# Patient Record
Sex: MALE | Race: BLACK OR AFRICAN AMERICAN | URBAN - METROPOLITAN AREA
[De-identification: names, ages, dates, MRNs, and addresses within clinical notes are randomized per-mention and may not be internally consistent; named-entity substitution may affect disease eponyms.]

---

## 2019-06-14 ENCOUNTER — OFFICE (OUTPATIENT)
Dept: URBAN - METROPOLITAN AREA CLINIC 78 | Facility: CLINIC | Age: 44
End: 2019-06-14

## 2019-06-14 VITALS
HEART RATE: 62 BPM | DIASTOLIC BLOOD PRESSURE: 94 MMHG | HEIGHT: 70 IN | SYSTOLIC BLOOD PRESSURE: 131 MMHG | TEMPERATURE: 97.5 F | WEIGHT: 190 LBS

## 2019-06-14 DIAGNOSIS — B19.10 UNSPECIFIED VIRAL HEPATITIS B WITHOUT HEPATIC COMA: ICD-10-CM

## 2019-06-14 PROCEDURE — 99244 OFF/OP CNSLTJ NEW/EST MOD 40: CPT

## 2019-06-14 NOTE — SERVICEHPINOTES
VITUS  AVOVABEY   is a   43   year old male who is being seen in consultation at the request of   VALERIE BAIG   for HBV diagnosed in 04/2019. He is here to establish care with our office as new patient. He suspects HBV contracted from childhood due to sister who also has it too: He was born in [place]. He states no h/o IVDU, blood transfusions, unprotected sex with high risk partners. Requesting labs from PCP (pending review will know what other labs may be needed). No RUQ u/s done so far. Prior colonoscopy in 2009 for "anal/rectal bleeding" that found evidence of hemorrhoids, otherwise "normal" per patent report. He states long h/o "heart palpations" that have been evaluated by PCP and cardiologist with negative cardiac work up per patient report. He states overall being healthy and HTN that is managed on lisinopril 5 mg qd. Denies chest pain, n/v, abdominal pain, melena, blood in stool, weight loss. No other complaints. BR

## 2019-12-04 ENCOUNTER — OFFICE (OUTPATIENT)
Dept: URBAN - METROPOLITAN AREA CLINIC 78 | Facility: CLINIC | Age: 44
End: 2019-12-04

## 2019-12-04 VITALS
HEIGHT: 70 IN | DIASTOLIC BLOOD PRESSURE: 75 MMHG | HEART RATE: 67 BPM | TEMPERATURE: 97.3 F | WEIGHT: 190 LBS | SYSTOLIC BLOOD PRESSURE: 123 MMHG

## 2019-12-04 DIAGNOSIS — B19.10 UNSPECIFIED VIRAL HEPATITIS B WITHOUT HEPATIC COMA: ICD-10-CM

## 2019-12-04 PROCEDURE — 99214 OFFICE O/P EST MOD 30 MIN: CPT

## 2019-12-04 NOTE — INTERFACERESULTNOTES
Low hepatitis B viral level but ALT liver enzyme is a little higher than optimal for men (like to see 35 or lower, yours if 46). Fibroscan shows some possible early fibrosis (scarring), so based on these factors as well as all the other things we discussed at the visit (age, male gender,  background, type of Hep B), I would proceed with the Viread (tenofovir disoproxil) 1 pill daily. Recheck CMP, HBV DNA, AFP in 3 months.

## 2019-12-04 NOTE — SERVICEHPINOTES
43 yo [place] male presents for f/u hepatitis B. He has eAg negative Hep B with recent normal liver enzymes and low viral level. He is treatment-naive, though Vemlidy had been prescribed and was denied Viread (generic) would be covered but patient wanted to hold off on treatment for now. He has an U/S scheduled for this month as he will be due for routine HCC screening. He has not yet had a Fibroscan. He feels well overall and has no GI concerns today.  Prior hx: He suspects HBV contracted from childhood his sister also has it.  He notes that he had a brother who passed away, possibly related to his liver but he is not sure. He states no h/o IVDU, blood transfusions, unprotected sex with high risk partners. 8/28/19 plt 168, AST/ALT 22/27, INR 1, HBV  IU/ml, HBeAg neg, AFP 2.3, Hep A ab tot pos/immune, Hep C ab negBR

## 2019-12-07 LAB
CBC (INCLUDES DIFF/PLT): ABSOLUTE BAND NEUTROPHILS: NORMAL CELLS/UL
CBC (INCLUDES DIFF/PLT): ABSOLUTE BASOPHILS: 21 CELLS/UL (ref 0–200)
CBC (INCLUDES DIFF/PLT): ABSOLUTE BLASTS: NORMAL CELLS/UL
CBC (INCLUDES DIFF/PLT): ABSOLUTE EOSINOPHILS: 39 CELLS/UL (ref 15–500)
CBC (INCLUDES DIFF/PLT): ABSOLUTE LYMPHOCYTES: 1842 CELLS/UL (ref 850–3900)
CBC (INCLUDES DIFF/PLT): ABSOLUTE METAMYELOCYTES: NORMAL CELLS/UL
CBC (INCLUDES DIFF/PLT): ABSOLUTE MONOCYTES: 402 CELLS/UL (ref 200–950)
CBC (INCLUDES DIFF/PLT): ABSOLUTE MYELOCYTES: NORMAL CELLS/UL
CBC (INCLUDES DIFF/PLT): ABSOLUTE NEUTROPHILS: 696 CELLS/UL — LOW (ref 1500–7800)
CBC (INCLUDES DIFF/PLT): ABSOLUTE NUCLEATED RBC: NORMAL CELLS/UL
CBC (INCLUDES DIFF/PLT): ABSOLUTE PROMYELOCYTES: NORMAL CELLS/UL
CBC (INCLUDES DIFF/PLT): BAND NEUTROPHILS: NORMAL %
CBC (INCLUDES DIFF/PLT): BASOPHILS: 0.7 %
CBC (INCLUDES DIFF/PLT): BLASTS: NORMAL %
CBC (INCLUDES DIFF/PLT): COMMENT(S): NORMAL
CBC (INCLUDES DIFF/PLT): EOSINOPHILS: 1.3 %
CBC (INCLUDES DIFF/PLT): HEMATOCRIT: 40.2 % (ref 38.5–50)
CBC (INCLUDES DIFF/PLT): HEMOGLOBIN: 14 G/DL (ref 13.2–17.1)
CBC (INCLUDES DIFF/PLT): LYMPHOCYTES: 61.4 %
CBC (INCLUDES DIFF/PLT): MCH: 26.6 PG — LOW (ref 27–33)
CBC (INCLUDES DIFF/PLT): MCHC: 34.8 G/DL (ref 32–36)
CBC (INCLUDES DIFF/PLT): MCV: 76.4 FL — LOW (ref 80–100)
CBC (INCLUDES DIFF/PLT): METAMYELOCYTES: NORMAL %
CBC (INCLUDES DIFF/PLT): MONOCYTES: 13.4 %
CBC (INCLUDES DIFF/PLT): MPV: 10.3 FL (ref 7.5–12.5)
CBC (INCLUDES DIFF/PLT): MYELOCYTES: NORMAL %
CBC (INCLUDES DIFF/PLT): NEUTROPHILS: 23.2 %
CBC (INCLUDES DIFF/PLT): NUCLEATED RBC: NORMAL /100 WBC
CBC (INCLUDES DIFF/PLT): PLATELET COUNT: 166 THOUSAND/UL (ref 140–400)
CBC (INCLUDES DIFF/PLT): PROMYELOCYTES: NORMAL %
CBC (INCLUDES DIFF/PLT): RDW: 15 % (ref 11–15)
CBC (INCLUDES DIFF/PLT): REACTIVE LYMPHOCYTES: NORMAL %
CBC (INCLUDES DIFF/PLT): RED BLOOD CELL COUNT: 5.26 MILLION/UL (ref 4.2–5.8)
CBC (INCLUDES DIFF/PLT): WHITE BLOOD CELL COUNT: 3 THOUSAND/UL — LOW (ref 3.8–10.8)
COMPREHENSIVE METABOLIC PANEL: ALBUMIN/GLOBULIN RATIO: 1.9 (CALC) (ref 1–2.5)
COMPREHENSIVE METABOLIC PANEL: ALBUMIN: 4.6 G/DL (ref 3.6–5.1)
COMPREHENSIVE METABOLIC PANEL: ALKALINE PHOSPHATASE: 52 U/L (ref 40–115)
COMPREHENSIVE METABOLIC PANEL: ALT: 46 U/L (ref 9–46)
COMPREHENSIVE METABOLIC PANEL: AST: 39 U/L (ref 10–40)
COMPREHENSIVE METABOLIC PANEL: BILIRUBIN, TOTAL: 0.8 MG/DL (ref 0.2–1.2)
COMPREHENSIVE METABOLIC PANEL: BUN/CREATININE RATIO: (no result) (CALC)
COMPREHENSIVE METABOLIC PANEL: CALCIUM: 9.6 MG/DL (ref 8.6–10.3)
COMPREHENSIVE METABOLIC PANEL: CARBON DIOXIDE: 31 MMOL/L (ref 20–32)
COMPREHENSIVE METABOLIC PANEL: CHLORIDE: 103 MMOL/L (ref 98–110)
COMPREHENSIVE METABOLIC PANEL: CREATININE: 0.88 MG/DL (ref 0.6–1.35)
COMPREHENSIVE METABOLIC PANEL: EGFR AFRICAN AMERICAN: 120 ML/MIN/1.73M2 (ref 60–?)
COMPREHENSIVE METABOLIC PANEL: EGFR NON-AFR. AMERICAN: 104 ML/MIN/1.73M2 (ref 60–?)
COMPREHENSIVE METABOLIC PANEL: GLOBULIN: 2.4 G/DL (CALC) (ref 1.9–3.7)
COMPREHENSIVE METABOLIC PANEL: GLUCOSE: 96 MG/DL (ref 65–99)
COMPREHENSIVE METABOLIC PANEL: POTASSIUM: 4.8 MMOL/L (ref 3.5–5.3)
COMPREHENSIVE METABOLIC PANEL: PROTEIN, TOTAL: 7 G/DL (ref 6.1–8.1)
COMPREHENSIVE METABOLIC PANEL: SODIUM: 140 MMOL/L (ref 135–146)
COMPREHENSIVE METABOLIC PANEL: UREA NITROGEN (BUN): 17 MG/DL (ref 7–25)
HEPATITIS B VIRUS DNA, QN, REAL TIME PCR: HEPATITIS B VIRUS DNA: 188 IU/ML — HIGH
HEPATITIS B VIRUS DNA, QN, REAL TIME PCR: HEPATITIS B VIRUS DNA: 2.27 LOG IU/ML — HIGH

## 2019-12-09 ENCOUNTER — OFFICE (OUTPATIENT)
Dept: URBAN - METROPOLITAN AREA CLINIC 101 | Facility: CLINIC | Age: 44
End: 2019-12-09

## 2019-12-09 DIAGNOSIS — B19.10 UNSPECIFIED VIRAL HEPATITIS B WITHOUT HEPATIC COMA: ICD-10-CM

## 2019-12-09 PROCEDURE — 91200 LIVER ELASTOGRAPHY: CPT

## 2021-01-19 ENCOUNTER — TELEHEALTH PROVIDED OTHER THAN IN PATIENT'S HOME (OUTPATIENT)
Dept: URBAN - METROPOLITAN AREA TELEHEALTH 7 | Facility: TELEHEALTH | Age: 46
End: 2021-01-19

## 2021-01-19 VITALS — HEIGHT: 70 IN | WEIGHT: 186 LBS

## 2021-01-19 DIAGNOSIS — B19.10 UNSPECIFIED VIRAL HEPATITIS B WITHOUT HEPATIC COMA: ICD-10-CM

## 2021-01-19 PROCEDURE — 99214 OFFICE O/P EST MOD 30 MIN: CPT | Mod: 95 | Performed by: PHYSICIAN ASSISTANT

## 2021-01-19 NOTE — SERVICEHPINOTES
PATIENT VERIFIED BY DATE OF BIRTH AND NAME. Patient has been consented for this telecommunication visit. 44 yo [place] male presents for f/u eAg negative hepatitis B. He started on Viread (Vemlidy was too expensive) in early 2020 and his viral level has been suppressed. He had some initial side effects that he thought was due to Viread and says that symptoms resembled COVID. He just had his U/S yesterday for routine HCC screening (we don't have results yet). Fibroscan in December 2019 showed S2, F2. He feels well overall and has no GI concerns today. He does ask if it's ok for him to not go so often for labs and U/S, especially since he also does routine labs with PCP.ROS today is negative.Prior hx: He suspects HBV contracted from childhood his sister also has it. He notes that he had a brother who passed away, possibly related to his liver but he is not sure. He states no h/o IVDU, blood transfusions, unprotected sex with high risk partners. 12/14/20 AST/ALT 29/37, plt 197, AFP 3.1, HBV DNA not detectedBR3/11/20 AST/ALT 38/50, AFP 2.8, HBV DNA not detectedBR8/28/19 plt 168, AST/ALT 22/27, INR 1, HBV  IU/ml, HBeAg neg, AFP 2.3, Hep A ab tot pos/immune, Hep C ab negBR

## 2022-03-30 ENCOUNTER — OFFICE (OUTPATIENT)
Dept: URBAN - METROPOLITAN AREA CLINIC 79 | Facility: CLINIC | Age: 47
End: 2022-03-30

## 2022-03-30 VITALS
TEMPERATURE: 96.6 F | DIASTOLIC BLOOD PRESSURE: 84 MMHG | HEIGHT: 70 IN | WEIGHT: 193 LBS | HEART RATE: 77 BPM | SYSTOLIC BLOOD PRESSURE: 122 MMHG

## 2022-03-30 DIAGNOSIS — Z12.11 ENCOUNTER FOR SCREENING FOR MALIGNANT NEOPLASM OF COLON: ICD-10-CM

## 2022-03-30 DIAGNOSIS — B19.10 UNSPECIFIED VIRAL HEPATITIS B WITHOUT HEPATIC COMA: ICD-10-CM

## 2022-03-30 PROCEDURE — 99214 OFFICE O/P EST MOD 30 MIN: CPT | Performed by: PHYSICIAN ASSISTANT

## 2022-03-30 RX ORDER — TENOFOVIR DISOPROXIL FUMARATE 300 MG/1
TABLET, COATED ORAL
Qty: 90 | Refills: 3 | Status: ACTIVE

## 2022-03-30 NOTE — SERVICEHPINOTES
47 yo [place] male presents for f/u eAg negative hepatitis B. He started on Viread (Vemlidy was too expensive) in early 2020 and his viral level has been suppressed. Fibroscan in December 2019 showed S2, F2. Recent U/S shows normal liver, no mass. He feels well overall and has no GI concerns today. No constipation, diarrhea or blood in stools. Had a colonoscopy in 2009. Prior hx: He suspects HBV contracted from childhood his sister also has it. He notes that he had a brother who passed away, possibly related to his liver but he is not sure. He states no h/o IVDU, blood transfusions, unprotected sex with high risk partners.br
br 9/22/21 plt 205, AST/ALT 19/18, AFP 3.1, HBV DNA not emyogwthck40/14/20 AST/ALT 29/37, plt 197, AFP 3.1, HBV DNA not detectedbr3/11/20 AST/ALT 38/50, AFP 2.8, HBV DNA not detectedbr8/28/19 plt 168, AST/ALT 22/27, INR 1, HBV  IU/ml, HBeAg neg, AFP 2.3, Hep A ab tot pos/immune, Hep C ab neg

## 2022-11-16 ENCOUNTER — OFFICE (OUTPATIENT)
Dept: URBAN - METROPOLITAN AREA CLINIC 102 | Facility: CLINIC | Age: 47
End: 2022-11-16

## 2022-11-16 DIAGNOSIS — B19.10 UNSPECIFIED VIRAL HEPATITIS B WITHOUT HEPATIC COMA: ICD-10-CM

## 2022-11-16 PROCEDURE — 91200 LIVER ELASTOGRAPHY: CPT | Performed by: INTERNAL MEDICINE

## 2023-08-18 ENCOUNTER — OFFICE (OUTPATIENT)
Dept: URBAN - METROPOLITAN AREA CLINIC 79 | Facility: CLINIC | Age: 48
End: 2023-08-18

## 2023-08-18 VITALS
TEMPERATURE: 97.6 F | WEIGHT: 199 LBS | SYSTOLIC BLOOD PRESSURE: 111 MMHG | HEART RATE: 70 BPM | DIASTOLIC BLOOD PRESSURE: 86 MMHG | HEIGHT: 70 IN

## 2023-08-18 DIAGNOSIS — B19.10 UNSPECIFIED VIRAL HEPATITIS B WITHOUT HEPATIC COMA: ICD-10-CM

## 2023-08-18 DIAGNOSIS — Z12.11 ENCOUNTER FOR SCREENING FOR MALIGNANT NEOPLASM OF COLON: ICD-10-CM

## 2023-08-18 PROCEDURE — 00031: CPT | Performed by: INTERNAL MEDICINE

## 2023-08-18 PROCEDURE — 99214 OFFICE O/P EST MOD 30 MIN: CPT | Performed by: PHYSICIAN ASSISTANT

## 2023-10-24 ENCOUNTER — OFFICE (OUTPATIENT)
Dept: URBAN - METROPOLITAN AREA CLINIC 30 | Facility: CLINIC | Age: 48
End: 2023-10-24
Payer: COMMERCIAL

## 2023-10-24 VITALS
TEMPERATURE: 97.3 F | RESPIRATION RATE: 17 BRPM | OXYGEN SATURATION: 97 % | HEART RATE: 89 BPM | RESPIRATION RATE: 16 BRPM | SYSTOLIC BLOOD PRESSURE: 143 MMHG | WEIGHT: 199 LBS | HEART RATE: 77 BPM | TEMPERATURE: 97.9 F | SYSTOLIC BLOOD PRESSURE: 110 MMHG | SYSTOLIC BLOOD PRESSURE: 124 MMHG | DIASTOLIC BLOOD PRESSURE: 77 MMHG | RESPIRATION RATE: 19 BRPM | HEART RATE: 68 BPM | OXYGEN SATURATION: 100 % | RESPIRATION RATE: 18 BRPM | HEART RATE: 70 BPM | DIASTOLIC BLOOD PRESSURE: 97 MMHG | SYSTOLIC BLOOD PRESSURE: 113 MMHG | OXYGEN SATURATION: 99 % | HEART RATE: 85 BPM | HEART RATE: 81 BPM | DIASTOLIC BLOOD PRESSURE: 78 MMHG | RESPIRATION RATE: 15 BRPM | SYSTOLIC BLOOD PRESSURE: 105 MMHG | DIASTOLIC BLOOD PRESSURE: 83 MMHG | HEIGHT: 70 IN | OXYGEN SATURATION: 98 %

## 2023-10-24 DIAGNOSIS — Z12.11 ENCOUNTER FOR SCREENING FOR MALIGNANT NEOPLASM OF COLON: ICD-10-CM

## 2023-10-24 DIAGNOSIS — K57.30 DIVERTICULOSIS OF LARGE INTESTINE WITHOUT PERFORATION OR ABS: ICD-10-CM

## 2024-02-22 ENCOUNTER — TELEHEALTH PROVIDED OTHER THAN IN PATIENT'S HOME (OUTPATIENT)
Dept: URBAN - METROPOLITAN AREA TELEHEALTH 7 | Facility: TELEHEALTH | Age: 49
End: 2024-02-22

## 2024-02-22 VITALS — HEIGHT: 70 IN | WEIGHT: 195 LBS

## 2024-02-22 DIAGNOSIS — B19.10 UNSPECIFIED VIRAL HEPATITIS B WITHOUT HEPATIC COMA: ICD-10-CM

## 2024-02-22 DIAGNOSIS — R11.2 NAUSEA WITH VOMITING, UNSPECIFIED: ICD-10-CM

## 2024-02-22 PROCEDURE — 99214 OFFICE O/P EST MOD 30 MIN: CPT | Mod: 95 | Performed by: PHYSICIAN ASSISTANT

## 2024-02-22 NOTE — SERVICENOTES
Patient's visit was conducted through [employer] video telecommunication. Patient consented before the start of visit as to understanding of privacy concerns, possible technological failure, and their responsibility of carrying out instructions of plan.

## 2024-02-22 NOTE — SERVICEHPINOTES
PATIENT VERIFIED BY DATE OF BIRTH AND NAME. Patient has been consented for this telecommunication visit.
martha thomas

47 yo [place] male followed by us for Hep B presents with nausea and vomiting. He reports nausea and epigastric discomfort, with one episode of vomiting. Symptoms lasted for about a week and happened in January. Has done fairly well over the past month. Might have occasional mild nausea, though seems to be improving. He notes long h/o heartburn based on what he eats, so he normally avoids trigger foods (tomatoes, pizza, spaghetti) and doesn't have heartburn when he avoids these. He denies dysphagia. He denies abdominal pain. No black stools. Notes some heart palpitations at times for which he had cardiac evaluation in the past (last year) and was told ok. No other concerns today. He had his updated colonoscopy done in October and he has a new lab and U/S order to get done soon for his Hep B monitoring.martha thomas ROS as above, otherwise negative.martha

## 2024-04-09 ENCOUNTER — TELEHEALTH PROVIDED OTHER THAN IN PATIENT'S HOME (OUTPATIENT)
Dept: URBAN - METROPOLITAN AREA TELEHEALTH 3 | Facility: TELEHEALTH | Age: 49
End: 2024-04-09

## 2024-04-09 VITALS — HEIGHT: 70 IN | WEIGHT: 190 LBS

## 2024-04-09 DIAGNOSIS — B96.81 HELICOBACTER PYLORI [H. PYLORI] AS THE CAUSE OF DISEASES CLA: ICD-10-CM

## 2024-04-09 DIAGNOSIS — B19.10 UNSPECIFIED VIRAL HEPATITIS B WITHOUT HEPATIC COMA: ICD-10-CM

## 2024-04-09 PROCEDURE — 99214 OFFICE O/P EST MOD 30 MIN: CPT | Mod: 95 | Performed by: PHYSICIAN ASSISTANT

## 2024-04-09 NOTE — SERVICENOTES
Patient was located at their place of work during visit., Patient's visit was conducted through [employer] video telecommunication. Patient consented before the start of visit as to understanding of privacy concerns, possible technological failure, and their responsibility of carrying out instructions of plan., I spent 20 minutes today with the patient for this telehealth visit.

## 2024-05-04 LAB
H PYLORI BREATH TEST: NEGATIVE
H. PYLORI BREATH COLLECTION: (no result)

## 2024-05-29 ENCOUNTER — OFFICE (OUTPATIENT)
Dept: URBAN - METROPOLITAN AREA CLINIC 30 | Facility: CLINIC | Age: 49
End: 2024-05-29
Payer: COMMERCIAL

## 2024-05-29 VITALS
HEART RATE: 72 BPM | SYSTOLIC BLOOD PRESSURE: 144 MMHG | SYSTOLIC BLOOD PRESSURE: 116 MMHG | RESPIRATION RATE: 16 BRPM | DIASTOLIC BLOOD PRESSURE: 91 MMHG | OXYGEN SATURATION: 97 % | DIASTOLIC BLOOD PRESSURE: 101 MMHG | TEMPERATURE: 97.7 F | TEMPERATURE: 97.3 F | WEIGHT: 190 LBS | DIASTOLIC BLOOD PRESSURE: 94 MMHG | OXYGEN SATURATION: 100 % | HEIGHT: 70 IN | DIASTOLIC BLOOD PRESSURE: 81 MMHG | RESPIRATION RATE: 21 BRPM | SYSTOLIC BLOOD PRESSURE: 156 MMHG | HEART RATE: 56 BPM | SYSTOLIC BLOOD PRESSURE: 138 MMHG | HEART RATE: 71 BPM | RESPIRATION RATE: 18 BRPM | HEART RATE: 84 BPM

## 2024-05-29 DIAGNOSIS — K21.9 GASTRO-ESOPHAGEAL REFLUX DISEASE WITHOUT ESOPHAGITIS: ICD-10-CM

## 2024-05-29 DIAGNOSIS — K30 FUNCTIONAL DYSPEPSIA: ICD-10-CM

## 2024-05-29 RX ORDER — PANTOPRAZOLE SODIUM 40 MG/1
40 TABLET, DELAYED RELEASE ORAL
Qty: 30 | Refills: 5 | Status: ACTIVE
Start: 2024-05-29

## 2025-07-09 ENCOUNTER — OFFICE (OUTPATIENT)
Dept: URBAN - METROPOLITAN AREA CLINIC 34 | Facility: CLINIC | Age: 50
End: 2025-07-09
Payer: COMMERCIAL

## 2025-07-09 VITALS
HEIGHT: 70 IN | WEIGHT: 202 LBS | SYSTOLIC BLOOD PRESSURE: 131 MMHG | DIASTOLIC BLOOD PRESSURE: 87 MMHG | HEART RATE: 88 BPM

## 2025-07-09 DIAGNOSIS — B19.10 UNSPECIFIED VIRAL HEPATITIS B WITHOUT HEPATIC COMA: ICD-10-CM

## 2025-07-09 PROCEDURE — 99214 OFFICE O/P EST MOD 30 MIN: CPT | Performed by: PHYSICIAN ASSISTANT
